# Patient Record
(demographics unavailable — no encounter records)

---

## 2025-01-18 NOTE — HISTORY OF PRESENT ILLNESS
[FreeTextEntry1] : Ms. PENA is a 54 year female presenting for evaluation of abnormal EKG and chest pain   Notable PMHx: - HTN - CAD - Arrhythmias - Valvular disease - HTN - HLD - DM2 - PAD - Stroke - Obesity - ADRIANA - Smoking - Prior pregnancies   HPI: Here for evaluation of HTN and chest pain. Recently in the ED for elevated BP at home in 170s in the setting of generalized viral symptoms. Several months ago had workup by cardiologist reportedly***. Vitals notable for fever in ED. UA unremarkable but empirically treated for UTI pending cultures. Cx negative. EKG with precordial TWI unchanged from 2023. Tropnin 7-7.  Functional Status: *** *** Denies chest pain, palpitations, light-headedness/dizziness, syncope, orthopnea, PND, ankle swelling, calf claudication, cough, fever/chills, snoring, apneic episodes      Social Hx: - tobacco use: - alcohol use: - recreational drug use: - occupation: - lives with:   Data Review: Labs - *** EKG - *** Echo - *** Stress - *** Cath - *** Other - ***    ------------------------------ ***  # Chest Pain # Abnormal EKG - TTE - check lipid panel, a1c, lp(a), vitamin D, TSH   RTC in *** or sooner PRN

## 2025-02-12 NOTE — DISCUSSION/SUMMARY
[FreeTextEntry1] : Here to establish care for chest pain and HTN. CTCA with mild disease. Discussed that she should establish care with PCP for ongoing care and to discuss non-cardiac findings. Counseled on diet/exercise. Suspect her dyspnea on exertion is related to deconditioning.  # Non-Cardiac Chest Pain # Non-Coronary CTCA Findings - f/u with PCP - provided with name/number to establish care  # HTN - c/w olmesartan 40mg qhs - start amlodipine 5mg qd  - monitor BP at home - lifestyle modifications  # Non-Obstructive CAD # HLD - start ASA 81mg qd - start atorvastatin 20mg qd - lipid panel, vit D in 2 months prior to follow-up  RTC in 2 months with labs 1-2 days prior in person or sooner PRN

## 2025-02-12 NOTE — HISTORY OF PRESENT ILLNESS
[FreeTextEntry1] : Ms. PENA is a 54 year female presenting for evaluation of abnormal EKG and chest pain   Notable PMHx: - HTN - HLD - Prediabetes - Obesity - fam hx of CAD in mother at 71 (no premature CAD) - 2 prior pregnancies: no complications - never smoker    HPI: Since last visit, had CTCA that showed no evidence of coronary disease and CACS of 0. Had influenza for last couple weeks but feeling better. /75 at home. TChol was last in the 300s and has lost 10lbs since then. Taking olmesartan qAM. Suspect her dyspnea on exertion is related to deconditioning.   1/20/25 Here for evaluation of HTN and chest pain. Recently in the ED for elevated BP at home in 170s in the setting of generalized viral symptoms. Several months ago had workup by cardiologist reportedly with normal TTE and echo exercise stress test. Vitals notable for fever in ED. UA unremarkable but empirically treated for UTI pending cultures. Cx negative. EKG with precordial TWI unchanged from 2023. Troponin 7-7. She continues to have intermittent chest pain is sharp, can occur with rest/exertion. Waking up due to chest pain and palpitations. Palpitaitons do not occur during the day.   Social Hx: , lives with 2 daughters (alive and well, one is a pediatrician), never smoker, no alcohol use, no drug use    Data Review: Labs - 4/2024: TChol 265, , . a1c 6.1. normal hgb, normal wbc, cr 0.9. tsh wnl  EKG - 1/20/25: SR. anterior twi unchanged from prior Echo - 4/3/24 TTE at OSH: LVEF 68%, no comment on wall motion, normal diastology, normal RV, no significant valvular disease, no pericardial effusion Stress - 4/5/24 pharm SPECT at OSH: no ischemia by EKG or perfusion, no rwma Cath - no prior Other -

## 2025-04-18 NOTE — HISTORY OF PRESENT ILLNESS
[FreeTextEntry1] : Ms. PENA is a 54 year female presenting for evaluation of chest pain   Notable PMHx: - HTN - HLD - Prediabetes - Obesity - fam hx of CAD in mother at 71 (no premature CAD) - 2 prior pregnancies: no complications - never smoker    HPI: Since last visit, started ASA and statin for non-obstructive CAD. Added amlodipine to HTN regimen. ***  2/12/25 Had CTCA that showed no evidence of coronary disease and CACS of 0. Had influenza for last couple weeks but feeling better. /75 at home. TChol was last in the 300s and has lost 10lbs since then. Taking olmesartan qAM. Suspect her dyspnea on exertion is related to deconditioning.   1/20/25 Here for evaluation of HTN and chest pain. Recently in the ED for elevated BP at home in 170s in the setting of generalized viral symptoms. Several months ago had workup by cardiologist reportedly with normal TTE and echo exercise stress test. Vitals notable for fever in ED. UA unremarkable but empirically treated for UTI pending cultures. Cx negative. EKG with precordial TWI unchanged from 2023. Troponin 7-7. She continues to have intermittent chest pain is sharp, can occur with rest/exertion. Waking up due to chest pain and palpitations. Palpitaitons do not occur during the day.   Social Hx: , lives with 2 daughters (alive and well, one is a pediatrician), never smoker, no alcohol use, no drug use    Data Review: Labs - 4/2024: TChol 265, , . a1c 6.1. normal hgb, normal wbc, cr 0.9. tsh wnl  EKG - 1/20/25: SR. anterior twi unchanged from prior Echo - 4/3/24 TTE at OSH: LVEF 68%, no comment on wall motion, normal diastology, normal RV, no significant valvular disease, no pericardial effusion Stress - 4/5/24 pharm SPECT at OSH: no ischemia by EKG or perfusion, no rwma Cath - no prior Cardiac CT - 2/6/2024: "Patent vessels". CACS 0. Left dominant. Mild ectasia of pLAD. Other -  ====  Here to establish care for chest pain and HTN. CTCA without disease. Chest pain is non-cardiac.   # HTN - c/w olmesartan 40mg qhs - c/w amlodipine 5mg qd - monitor BP at home  # HLD # CTCA without coronary disease ***- STOP ASA 81mg qd ***- c/w atorvastatin 20mg qd (can consider stopping with ongoing weight loss and lifestyle changes)  RTC in 6 months in person or sooner PRN.

## 2025-06-30 NOTE — PHYSICAL EXAM
[TextEntry] : General: NAD Neck: no carotid bruits Cardiac: nl s1s2, RRR, no mrg, no JVD, no peripheral edema Lungs: CTAB. Normal respiratory effort Vascular: lower extremities warm/well perfused Neuro: moves all extremities Psych: normal affect, normal mood

## 2025-06-30 NOTE — DISCUSSION/SUMMARY
[FreeTextEntry1] : Here for HTN and HLD. Her BP at home is elevated and A1c remains elevated. She is going to trial aggressive lifestyle interventions for now and will reassess in 3 months the need for adjustment in BP meds, lipid lowering therapy, and/or weight loss medication  # HTN - c/w Olmesartan 40mg qhs - c/w amlodipine 5mg qd - monitor BP at home  # HLD # Obesity # Prediabetes - c/w atorvastatin 20mg qd - aggressive lifestyle interventions - discussed possibility of using GLP-1 agonist if not improving  RTC in 3 months with labs 1 week prior or sooner PRN [EKG obtained to assist in diagnosis and management of assessed problem(s)] : EKG obtained to assist in diagnosis and management of assessed problem(s)

## 2025-06-30 NOTE — HISTORY OF PRESENT ILLNESS
[FreeTextEntry1] : Ms. PENA is a 55 year female presenting for follow-up of HTN and HLD   Notable PMHx: - HTN - HLD - Prediabetes - Obesity - fam hx of CAD in mother at 71 (no premature CAD) - 2 prior pregnancies: no complications - never smoker    HPI: Since last visit, saw Jossy in nutrition. We started amlodipine, asa, and statin. At home -150/60-70s. Labs from this month from [facility] show LDL 81 and nonHDL 99 though A1c of 6.0. No chest pain or dyspnea. She endorses poor diet and exercise in the last couple months due to several family events.  2/12/25 had CTCA that showed no evidence of coronary disease and CACS of 0. Had influenza for last couple weeks but feeling better. /75 at home. TChol was last in the 300s and has lost 10lbs since then. Taking olmesartan qAM. Suspect her dyspnea on exertion is related to deconditioning.   1/20/25 Here for evaluation of HTN and chest pain. Recently in the ED for elevated BP at home in 170s in the setting of generalized viral symptoms. Several months ago had workup by cardiologist reportedly with normal TTE and echo exercise stress test. Vitals notable for fever in ED. UA unremarkable but empirically treated for UTI pending cultures. Cx negative. EKG with precordial TWI unchanged from 2023. Troponin 7-7. She continues to have intermittent chest pain is sharp, can occur with rest/exertion. Waking up due to chest pain and palpitations. Palpitaitons do not occur during the day.   Social Hx: works as a , lives with 2 daughters (alive and well, one is a pediatrician), never smoker, no alcohol use, no drug use    Data Review: Echo - 4/3/24 TTE at OSH: LVEF 68%, no comment on wall motion, normal diastology, normal RV, no significant valvular disease, no pericardial effusion Stress - 4/5/24 pharm SPECT at OSH: no ischemia by EKG or perfusion, no rwma Cath - no prior Cardiac CT - 2/2025 Cardiac CT: "patent vessels" CACS 0.  Other -